# Patient Record
Sex: FEMALE | Race: WHITE | Employment: OTHER | URBAN - METROPOLITAN AREA
[De-identification: names, ages, dates, MRNs, and addresses within clinical notes are randomized per-mention and may not be internally consistent; named-entity substitution may affect disease eponyms.]

---

## 2017-01-17 ENCOUNTER — TELEPHONE (OUTPATIENT)
Dept: INTERNAL MEDICINE CLINIC | Facility: CLINIC | Age: 67
End: 2017-01-17

## 2017-01-17 ENCOUNTER — OFFICE VISIT (OUTPATIENT)
Dept: INTERNAL MEDICINE CLINIC | Facility: CLINIC | Age: 67
End: 2017-01-17

## 2017-01-17 VITALS
RESPIRATION RATE: 20 BRPM | SYSTOLIC BLOOD PRESSURE: 124 MMHG | HEART RATE: 83 BPM | DIASTOLIC BLOOD PRESSURE: 63 MMHG | WEIGHT: 210 LBS | BODY MASS INDEX: 33 KG/M2

## 2017-01-17 DIAGNOSIS — Z01.419 ENCOUNTER FOR ROUTINE GYNECOLOGIC EXAMINATION IN MEDICARE PATIENT: Primary | ICD-10-CM

## 2017-01-17 DIAGNOSIS — Z12.4 PAP SMEAR FOR CERVICAL CANCER SCREENING: ICD-10-CM

## 2017-01-17 DIAGNOSIS — Z12.31 ENCOUNTER FOR SCREENING MAMMOGRAM FOR MALIGNANT NEOPLASM OF BREAST: ICD-10-CM

## 2017-01-17 DIAGNOSIS — L71.9 ROSACEA: ICD-10-CM

## 2017-01-17 PROCEDURE — G0463 HOSPITAL OUTPT CLINIC VISIT: HCPCS | Performed by: INTERNAL MEDICINE

## 2017-01-17 PROCEDURE — 99214 OFFICE O/P EST MOD 30 MIN: CPT | Performed by: INTERNAL MEDICINE

## 2017-01-17 RX ORDER — METRONIDAZOLE 7.5 MG/G
GEL VAGINAL
Refills: 0 | Status: CANCELLED | OUTPATIENT
Start: 2017-01-17

## 2017-01-17 RX ORDER — METRONIDAZOLE 7.5 MG/G
GEL VAGINAL
COMMUNITY
End: 2018-08-27

## 2017-01-17 RX ORDER — METRONIDAZOLE 10 MG/G
GEL TOPICAL
Qty: 60 G | Refills: 1 | Status: SHIPPED | OUTPATIENT
Start: 2017-01-17 | End: 2019-05-14

## 2017-01-19 ENCOUNTER — HOSPITAL ENCOUNTER (OUTPATIENT)
Dept: MAMMOGRAPHY | Age: 67
Discharge: HOME | End: 2017-01-19
Attending: INTERNAL MEDICINE
Payer: MEDICARE

## 2017-01-19 DIAGNOSIS — Z12.31 ENCOUNTER FOR SCREENING MAMMOGRAM FOR MALIGNANT NEOPLASM OF BREAST: ICD-10-CM

## 2017-01-19 PROCEDURE — 77067 SCR MAMMO BI INCL CAD: CPT

## 2017-01-20 LAB — LAST PAP RESULT: NORMAL

## 2017-01-23 LAB — HPV I/H RISK 1 DNA SPEC QL NAA+PROBE: NEGATIVE

## 2017-01-27 ENCOUNTER — OFFICE VISIT (OUTPATIENT)
Dept: INTERNAL MEDICINE CLINIC | Facility: CLINIC | Age: 67
End: 2017-01-27

## 2017-01-27 VITALS
BODY MASS INDEX: 33.19 KG/M2 | SYSTOLIC BLOOD PRESSURE: 111 MMHG | RESPIRATION RATE: 18 BRPM | TEMPERATURE: 98 F | HEIGHT: 66.5 IN | DIASTOLIC BLOOD PRESSURE: 64 MMHG | HEART RATE: 72 BPM | WEIGHT: 209 LBS

## 2017-01-27 DIAGNOSIS — Z00.00 PHYSICAL EXAM: Primary | ICD-10-CM

## 2017-01-27 DIAGNOSIS — E78.5 HYPERLIPIDEMIA LDL GOAL <130: ICD-10-CM

## 2017-01-27 DIAGNOSIS — R53.81 MALAISE: ICD-10-CM

## 2017-01-27 PROCEDURE — G0438 PPPS, INITIAL VISIT: HCPCS | Performed by: INTERNAL MEDICINE

## 2017-01-27 RX ORDER — ALBUTEROL SULFATE 90 UG/1
2 AEROSOL, METERED RESPIRATORY (INHALATION) EVERY 4 HOURS PRN
Qty: 1 INHALER | Refills: 3 | Status: SHIPPED | OUTPATIENT
Start: 2017-01-27 | End: 2018-01-26

## 2017-01-28 PROBLEM — Z12.4 PAP SMEAR FOR CERVICAL CANCER SCREENING: Status: RESOLVED | Noted: 2017-01-17 | Resolved: 2017-01-28

## 2017-01-28 PROBLEM — Z12.31 ENCOUNTER FOR SCREENING MAMMOGRAM FOR MALIGNANT NEOPLASM OF BREAST: Status: RESOLVED | Noted: 2017-01-17 | Resolved: 2017-01-28

## 2017-01-28 PROBLEM — Z01.419 ENCOUNTER FOR ROUTINE GYNECOLOGIC EXAMINATION IN MEDICARE PATIENT: Status: RESOLVED | Noted: 2017-01-17 | Resolved: 2017-01-28

## 2017-01-29 NOTE — PROGRESS NOTES
HPI:   Kirsten Menon is a 77year old female who presents for a Medicare Initial Annual Wellness visit (Once after 12 month Medicare anniversery) .     Patient reports that she is doing very well, she is exercising every day, has lost weight, states that s (VITAMIN B12) 100 MCG Oral Tab Take  by mouth. MEDICAL INFORMATION:   She  has a past medical history of Depression; Right leg DVT ([address] (1990); Ruptured ovarian cyst (1975); Knee problem (2008); and BCC (basal cell carcinoma) (2014, 2002).     She  ha Hearing Assessment  (Required for AWV/SWV)    Hearing Screening    Screening Method:  Questionnaire      I have a problem hearing over the telephone:  No I have trouble following   the conversations when two or more people are talking at the same time: symmetric, no tenderness/mass/nodules; no carotid bruit or JVD   Back:   Symmetric, no curvature, ROM normal, no CVA tenderness   Lungs:   Clear to auscultation bilaterally, respirations unlabored   Heart:  Regular rate and rhythm, S1 and S2 normal, no mur check lipids continue low-fat low-cholesterol diet  -     Albuterol Sulfate HFA (PROAIR HFA) 108 (90 Base) MCG/ACT Inhalation Aero Soln; Inhale 2 puffs into the lungs every 4 (four) hours as needed for Wheezing.  -     CBC W Differential W Platelet [E];  Fu Showering: Able without help    Toileting: Able without help    Dressing: Able without help    Eating: Able without help    Driving: Able without help    Preparing your meals: Able without help    Managing money/bills: Able without help    Taking medicatio Internal Lab or Procedure External Lab or Procedure   Diabetes Screening      HbgA1C   Annually GLYCOHEMOGLOBIN (HGA1C) (L) (%)   Date Value   11/23/2011 5.4    No flowsheet data found.     Fasting Blood Sugar (FSB)Annually   GLUCOSE (P) (mg/dL)   Date Valu for this or any previous visit.          SPECIFIC DISEASE MONITORING Internal Lab or Procedure External Lab or Procedure   Annual Monitoring of Persistent     Medications (ACE/ARB, digoxin diuretics, anticonvulsants.)    Potassium  Annually POTASSIUM (P) (m

## 2017-01-29 NOTE — PROGRESS NOTES
HPI:    Patient ID: Kameron Shah is a 77year old female     HPI    Review of Systems         Current Outpatient Prescriptions:  Albuterol Sulfate HFA (PROAIR HFA) 108 (90 Base) MCG/ACT Inhalation Aero Soln Inhale 2 puffs into the lungs every 4 (four) ho

## 2017-02-03 ENCOUNTER — OFFICE VISIT (OUTPATIENT)
Dept: FAMILY MEDICINE CLINIC | Facility: CLINIC | Age: 67
End: 2017-02-03

## 2017-02-03 ENCOUNTER — TELEPHONE (OUTPATIENT)
Dept: FAMILY MEDICINE CLINIC | Facility: CLINIC | Age: 67
End: 2017-02-03

## 2017-02-03 ENCOUNTER — HOSPITAL ENCOUNTER (OUTPATIENT)
Dept: GENERAL RADIOLOGY | Age: 67
Discharge: HOME | End: 2017-02-03
Attending: FAMILY MEDICINE | Admitting: FAMILY MEDICINE
Payer: MEDICARE

## 2017-02-03 VITALS
WEIGHT: 205 LBS | DIASTOLIC BLOOD PRESSURE: 72 MMHG | TEMPERATURE: 98 F | SYSTOLIC BLOOD PRESSURE: 118 MMHG | HEART RATE: 75 BPM | RESPIRATION RATE: 12 BRPM | OXYGEN SATURATION: 95 % | BODY MASS INDEX: 33 KG/M2

## 2017-02-03 DIAGNOSIS — R05.9 COUGH: Primary | ICD-10-CM

## 2017-02-03 DIAGNOSIS — R05.9 COUGH: ICD-10-CM

## 2017-02-03 PROCEDURE — 71020 XR CHEST PA + LAT CHEST (CPT=71020): CPT

## 2017-02-03 PROCEDURE — G0463 HOSPITAL OUTPT CLINIC VISIT: HCPCS | Performed by: FAMILY MEDICINE

## 2017-02-03 PROCEDURE — 99214 OFFICE O/P EST MOD 30 MIN: CPT | Performed by: FAMILY MEDICINE

## 2017-02-03 RX ORDER — AMOXICILLIN AND CLAVULANATE POTASSIUM 875; 125 MG/1; MG/1
1 TABLET, FILM COATED ORAL 2 TIMES DAILY
Qty: 28 TABLET | Refills: 0 | Status: SHIPPED | OUTPATIENT
Start: 2017-02-03 | End: 2017-02-08

## 2017-02-03 RX ORDER — FLUTICASONE PROPIONATE 50 MCG
SPRAY, SUSPENSION (ML) NASAL DAILY
COMMUNITY
End: 2017-02-04

## 2017-02-03 NOTE — PROGRESS NOTES
Blood pressure 118/72, pulse 75, temperature 98 °F (36.7 °C), temperature source Oral, resp. rate 12, weight 205 lb (92.987 kg), SpO2 95 %.   Patient presents today complaining of a four-day history of a sore throat and some facial pressure history of asthm

## 2017-02-03 NOTE — TELEPHONE ENCOUNTER
----- Message from Estrella Etienne DO sent at 2/3/2017  1:26 PM CST -----  CXR NORMAL. RX SENT TO PHARMACY FOR SINUSITIS.

## 2017-02-04 RX ORDER — FLUTICASONE PROPIONATE 50 MCG
2 SPRAY, SUSPENSION (ML) NASAL DAILY
Qty: 1 BOTTLE | Refills: 1 | Status: SHIPPED | OUTPATIENT
Start: 2017-02-04 | End: 2018-01-26

## 2017-02-04 NOTE — TELEPHONE ENCOUNTER
Pt informed regarding Augmentin sent in by  [facility] [place] yesterday since chest result was normal. Pt asking what she can take to help her chest congestion (not taking anything OTC); states also has PND. Should she also use Flonase?  If so, would like rx for flonas

## 2017-02-06 RX ORDER — MOMETASONE FUROATE AND FORMOTEROL FUMARATE DIHYDRATE 200; 5 UG/1; UG/1
AEROSOL RESPIRATORY (INHALATION)
Qty: 39 G | Refills: 1 | Status: SHIPPED | OUTPATIENT
Start: 2017-02-06 | End: 2018-01-08

## 2017-02-07 ENCOUNTER — TELEPHONE (OUTPATIENT)
Dept: INTERNAL MEDICINE CLINIC | Facility: CLINIC | Age: 67
End: 2017-02-07

## 2017-02-07 RX ORDER — ALBUTEROL SULFATE 90 UG/1
2 AEROSOL, METERED RESPIRATORY (INHALATION) EVERY 4 HOURS PRN
Qty: 1 INHALER | Refills: 6 | Status: SHIPPED | OUTPATIENT
Start: 2017-02-07 | End: 2018-08-27

## 2017-02-07 NOTE — TELEPHONE ENCOUNTER
[facility] pharmacy would like to know if they could change pt's proair rx to ventolin?  Pt is currently at the pharmacy

## 2017-02-07 NOTE — TELEPHONE ENCOUNTER
Pt calling states she would like to speak to Dr Sina Mcfarlane about returning issue with sinus infection. Pt states despite meds she is not improving. Please advise.

## 2017-02-08 ENCOUNTER — OFFICE VISIT (OUTPATIENT)
Dept: INTERNAL MEDICINE CLINIC | Facility: CLINIC | Age: 67
End: 2017-02-08

## 2017-02-08 VITALS
BODY MASS INDEX: 33 KG/M2 | RESPIRATION RATE: 20 BRPM | WEIGHT: 206 LBS | HEART RATE: 79 BPM | OXYGEN SATURATION: 96 % | TEMPERATURE: 98 F | DIASTOLIC BLOOD PRESSURE: 66 MMHG | SYSTOLIC BLOOD PRESSURE: 112 MMHG

## 2017-02-08 DIAGNOSIS — J44.9 BRONCHITIS WITH CHRONIC AIRWAY OBSTRUCTION (HCC): Primary | ICD-10-CM

## 2017-02-08 PROBLEM — J44.89 BRONCHITIS WITH CHRONIC AIRWAY OBSTRUCTION: Status: ACTIVE | Noted: 2017-02-08

## 2017-02-08 PROCEDURE — 99214 OFFICE O/P EST MOD 30 MIN: CPT | Performed by: INTERNAL MEDICINE

## 2017-02-08 PROCEDURE — G0463 HOSPITAL OUTPT CLINIC VISIT: HCPCS | Performed by: INTERNAL MEDICINE

## 2017-02-08 RX ORDER — PREDNISONE 10 MG/1
TABLET ORAL
Qty: 30 TABLET | Refills: 1 | Status: SHIPPED | OUTPATIENT
Start: 2017-02-08 | End: 2017-02-19

## 2017-02-08 RX ORDER — CLARITHROMYCIN 500 MG/1
500 TABLET, COATED ORAL 2 TIMES DAILY
Qty: 20 TABLET | Refills: 0 | Status: SHIPPED | OUTPATIENT
Start: 2017-02-08 | End: 2018-01-08

## 2017-02-08 RX ORDER — OXYBUTYNIN CHLORIDE 5 MG/1
TABLET ORAL
COMMUNITY
Start: 2017-02-04 | End: 2018-01-26

## 2017-02-08 RX ORDER — PREDNISONE 10 MG/1
TABLET ORAL
Qty: 30 TABLET | Refills: 1 | Status: SHIPPED
Start: 2017-02-08 | End: 2017-02-19

## 2017-02-08 RX ORDER — PREDNISONE 10 MG/1
TABLET ORAL
Qty: 30 TABLET | Refills: 0 | Status: SHIPPED
Start: 2017-02-08 | End: 2017-02-08 | Stop reason: CLARIF

## 2017-02-08 NOTE — PROGRESS NOTES
HPI:    Patient ID: France Villarreal is a 77year old female presents for follow-up of the cough.     HPI  Patient was seen in the office 5 days ago by family physician, diagnosed with bronchitis, started to Augmentin, Flonase nasal spray, and patient has bee Inhalation Aero Soln Inhale 2 puffs into the lungs every 4 (four) hours as needed for Wheezing. Disp: 1 Inhaler Rfl: 6   Fluticasone Propionate 50 MCG/ACT Nasal Suspension 2 sprays by Each Nare route daily.  Disp: 1 Bottle Rfl: 1   Mometasone Furo-Formotero present. Pulmonary/Chest: Effort normal. She has wheezes in the right middle field, the right lower field, the left upper field, the left middle field and the left lower field. She has no rhonchi. She has no rales.    Diffuse expiratory wheezing, frequentl

## 2017-02-17 ENCOUNTER — TELEPHONE (OUTPATIENT)
Dept: INTERNAL MEDICINE CLINIC | Facility: CLINIC | Age: 67
End: 2017-02-17

## 2017-02-17 NOTE — TELEPHONE ENCOUNTER
Patient calling to inform Jannet Meyer that she is feeling much better and wants to thank Jannet Meyer for taking such good care of her.

## 2017-12-21 RX ORDER — MOMETASONE FUROATE AND FORMOTEROL FUMARATE DIHYDRATE 200; 5 UG/1; UG/1
AEROSOL RESPIRATORY (INHALATION)
Qty: 13 G | Refills: 0 | Status: SHIPPED | OUTPATIENT
Start: 2017-12-21 | End: 2019-05-14

## 2017-12-31 ENCOUNTER — HOSPITAL ENCOUNTER (OUTPATIENT)
Age: 67
Discharge: HOME | End: 2017-12-31
Attending: EMERGENCY MEDICINE
Payer: MEDICARE

## 2017-12-31 VITALS
HEART RATE: 110 BPM | SYSTOLIC BLOOD PRESSURE: 134 MMHG | RESPIRATION RATE: 20 BRPM | HEIGHT: 67 IN | OXYGEN SATURATION: 99 % | TEMPERATURE: 98 F | BODY MASS INDEX: 31.39 KG/M2 | WEIGHT: 200 LBS | DIASTOLIC BLOOD PRESSURE: 84 MMHG

## 2017-12-31 DIAGNOSIS — J20.9 ACUTE BRONCHITIS, UNSPECIFIED ORGANISM: ICD-10-CM

## 2017-12-31 DIAGNOSIS — H66.92 LEFT OTITIS MEDIA, UNSPECIFIED OTITIS MEDIA TYPE: Primary | ICD-10-CM

## 2017-12-31 PROCEDURE — 99213 OFFICE O/P EST LOW 20 MIN: CPT

## 2017-12-31 PROCEDURE — 99204 OFFICE O/P NEW MOD 45 MIN: CPT

## 2017-12-31 RX ORDER — AMOXICILLIN 875 MG/1
875 TABLET, COATED ORAL 2 TIMES DAILY
Qty: 20 TABLET | Refills: 0 | Status: SHIPPED | OUTPATIENT
Start: 2017-12-31 | End: 2018-01-10

## 2017-12-31 RX ORDER — BENZONATATE 200 MG/1
200 CAPSULE ORAL 3 TIMES DAILY PRN
Qty: 15 CAPSULE | Refills: 0 | Status: SHIPPED | OUTPATIENT
Start: 2017-12-31 | End: 2018-01-05

## 2017-12-31 NOTE — ED PROVIDER NOTES
Patient Seen in: [facility]    History   Patient presents with:  Cough/URI    Stated Complaint: sinus problem    HPI  Patient complains of cough and congestion for 2-1/2 weeks.   There is been sinus pain first on the right person, place, and time. She appears well-developed and well-nourished. No distress. Well appearing   HENT:   Head: Normocephalic and atraumatic.    Right Ear: Tympanic membrane and external ear normal.   Left Ear: External ear normal. Tympanic membrane i

## 2018-01-08 ENCOUNTER — OFFICE VISIT (OUTPATIENT)
Dept: INTERNAL MEDICINE CLINIC | Facility: CLINIC | Age: 68
End: 2018-01-08

## 2018-01-08 VITALS
TEMPERATURE: 98 F | WEIGHT: 214 LBS | SYSTOLIC BLOOD PRESSURE: 128 MMHG | BODY MASS INDEX: 34 KG/M2 | RESPIRATION RATE: 22 BRPM | OXYGEN SATURATION: 98 % | DIASTOLIC BLOOD PRESSURE: 65 MMHG | HEART RATE: 83 BPM

## 2018-01-08 DIAGNOSIS — E78.5 HYPERLIPIDEMIA LDL GOAL <130: ICD-10-CM

## 2018-01-08 DIAGNOSIS — J20.9 BRONCHITIS, ACUTE, WITH BRONCHOSPASM: Primary | ICD-10-CM

## 2018-01-08 PROCEDURE — 99212 OFFICE O/P EST SF 10 MIN: CPT | Performed by: INTERNAL MEDICINE

## 2018-01-08 PROCEDURE — 99214 OFFICE O/P EST MOD 30 MIN: CPT | Performed by: INTERNAL MEDICINE

## 2018-01-08 RX ORDER — CODEINE PHOSPHATE AND GUAIFENESIN 10; 100 MG/5ML; MG/5ML
10 SOLUTION ORAL 4 TIMES DAILY PRN
Qty: 240 ML | Refills: 0 | Status: SHIPPED | OUTPATIENT
Start: 2018-01-08 | End: 2018-08-27 | Stop reason: ALTCHOICE

## 2018-01-08 RX ORDER — CLARITHROMYCIN 500 MG/1
500 TABLET, COATED ORAL 2 TIMES DAILY
Qty: 20 TABLET | Refills: 0 | Status: SHIPPED | OUTPATIENT
Start: 2018-01-08 | End: 2018-01-16

## 2018-01-09 NOTE — PROGRESS NOTES
HPI:    Patient ID: Romaine Pastrana is a 79year old female. Presents for evaluation of the cough.     HPI  Patient reports that for about a month she has been bothered by cough, initially she had cold symptoms that lasted few days and resolved, and shortly Oral Solution  Disp:  Rfl:    Fexofenadine HCl (ALLEGRA OR) Take 1 tablet daily Disp:  Rfl:    metRONIDAZOLE 0.75 % Vaginal Gel Used for rocea ,affected area Disp:  Rfl:    MetroNIDAZOLE (METROGEL) 1 % External Gel Apply thin layer  daily Disp: 60 g Rfl: 1 oropharyngeal erythema present. Which is present   Eyes: Conjunctivae are normal. Pupils are equal, round, and reactive to light. Cardiovascular: Normal rate, S2 normal and normal heart sounds. Pulmonary/Chest: No accessory muscle usage.  No respirat

## 2018-01-09 NOTE — PROGRESS NOTES
HPI:    Patient ID: Connor Sol is a 79year old female. Presents for evaluation of the cough.     HPI  Patient reports that for about a month she has been bothered by cough, initially she had cold symptoms that lasted few days and resolved, and shortly MG/5ML Oral Solution  Disp:  Rfl:    Fexofenadine HCl (ALLEGRA OR) Take 1 tablet daily Disp:  Rfl:    metRONIDAZOLE 0.75 % Vaginal Gel Used for rocea ,affected area Disp:  Rfl:    MetroNIDAZOLE (METROGEL) 1 % External Gel Apply thin layer  daily Disp: 60 g mL by mouth 4 (four) times daily as needed for cough.            Imaging & Referrals:  None         #[id number]

## 2018-01-16 ENCOUNTER — TELEPHONE (OUTPATIENT)
Dept: INTERNAL MEDICINE CLINIC | Facility: CLINIC | Age: 68
End: 2018-01-16

## 2018-01-16 RX ORDER — CLARITHROMYCIN 500 MG/1
500 TABLET, COATED ORAL 2 TIMES DAILY
Qty: 10 TABLET | Refills: 0 | Status: SHIPPED | OUTPATIENT
Start: 2018-01-16 | End: 2018-01-26

## 2018-01-16 NOTE — TELEPHONE ENCOUNTER
Patient prescribed 10 day regimen of clarithromycin on 1/8. Pt states she has been taking it and her symptoms have improved but she feels like she would like a few extra days of the abx to knock out her illness.     Pt requesting 4 more days of the abx to b

## 2018-01-25 ENCOUNTER — TELEPHONE (OUTPATIENT)
Dept: OTHER | Age: 68
End: 2018-01-25

## 2018-01-25 NOTE — TELEPHONE ENCOUNTER
Patient can see any doctor at the clinic if needed, I can see her tomorrow at 9;40 am double book in the morning to [place] if she wants to

## 2018-01-25 NOTE — TELEPHONE ENCOUNTER
Pt called, message per Dr given.   Verbalized understanding and compliance    appt scheduled 9:40 1/26/18

## 2018-01-25 NOTE — TELEPHONE ENCOUNTER
Patient reported that since Immediate Care visit 12/31/17, office visit with Dr. Dewey Muhammad 1/8/18, she has finished antibiotics on 1/23/18 and was feeling better, but not today, feeling worse, didn't sleep well last night and has history of asthma.  Patient st

## 2018-01-26 ENCOUNTER — OFFICE VISIT (OUTPATIENT)
Dept: INTERNAL MEDICINE CLINIC | Facility: CLINIC | Age: 68
End: 2018-01-26

## 2018-01-26 VITALS
TEMPERATURE: 98 F | SYSTOLIC BLOOD PRESSURE: 130 MMHG | BODY MASS INDEX: 33 KG/M2 | DIASTOLIC BLOOD PRESSURE: 66 MMHG | WEIGHT: 210 LBS | RESPIRATION RATE: 18 BRPM | HEART RATE: 73 BPM

## 2018-01-26 DIAGNOSIS — J01.90 ACUTE SINUSITIS, RECURRENCE NOT SPECIFIED, UNSPECIFIED LOCATION: Primary | ICD-10-CM

## 2018-01-26 PROCEDURE — 99212 OFFICE O/P EST SF 10 MIN: CPT | Performed by: INTERNAL MEDICINE

## 2018-01-26 PROCEDURE — 99213 OFFICE O/P EST LOW 20 MIN: CPT | Performed by: INTERNAL MEDICINE

## 2018-01-26 RX ORDER — CEFUROXIME AXETIL 500 MG/1
500 TABLET ORAL 2 TIMES DAILY
Qty: 14 TABLET | Refills: 0 | Status: SHIPPED | OUTPATIENT
Start: 2018-01-26 | End: 2018-01-26

## 2018-01-26 RX ORDER — CEFUROXIME AXETIL 500 MG/1
500 TABLET ORAL 2 TIMES DAILY
Qty: 14 TABLET | Refills: 0 | Status: SHIPPED | OUTPATIENT
Start: 2018-01-26 | End: 2018-08-27 | Stop reason: ALTCHOICE

## 2018-01-26 NOTE — PROGRESS NOTES
HPI:    Patient ID: Jericho Yoder is a 79year old female presents for evaluation of the cough    HPI  Patient reports that last couple days she has been having more nasal congestion, feels postnasal drainage that irritates her throat and causes cough, co ,affected area Disp:  Rfl:    MetroNIDAZOLE (METROGEL) 1 % External Gel Apply thin layer  daily Disp: 60 g Rfl: 1   Omega-3 Fatty Acids (OMEGA-3 FISH OIL) 1000 MG Oral Cap Take  by mouth.  Disp:  Rfl:    Multiple Vitamin (MULTIVITAMINS) Oral Cap Take  by mo washes to the nose, if no improvement in 7-10 days will consider antibiotic, patient to report if she is not better in 7-10 days  No orders of the defined types were placed in this encounter.       Meds This Visit:  Signed Prescriptions Disp Refills    Cefu

## 2018-02-20 ENCOUNTER — LAB ENCOUNTER (OUTPATIENT)
Dept: LAB | Age: 68
End: 2018-02-20
Attending: INTERNAL MEDICINE
Payer: MEDICARE

## 2018-02-20 DIAGNOSIS — E78.5 HYPERLIPIDEMIA LDL GOAL <130: ICD-10-CM

## 2018-02-20 DIAGNOSIS — J20.9 BRONCHITIS, ACUTE, WITH BRONCHOSPASM: ICD-10-CM

## 2018-02-20 LAB
ALBUMIN SERPL BCP-MCNC: 3.8 G/DL (ref 3.5–4.8)
ALBUMIN/GLOB SERPL: 1.4 {RATIO} (ref 1–2)
ALP SERPL-CCNC: 69 U/L (ref 32–100)
ALT SERPL-CCNC: 15 U/L (ref 14–54)
ANION GAP SERPL CALC-SCNC: 7 MMOL/L (ref 0–18)
AST SERPL-CCNC: 20 U/L (ref 15–41)
BASOPHILS # BLD: 0 K/UL (ref 0–0.2)
BASOPHILS NFR BLD: 1 %
BILIRUB SERPL-MCNC: 0.5 MG/DL (ref 0.3–1.2)
BUN SERPL-MCNC: 11 MG/DL (ref 8–20)
BUN/CREAT SERPL: 14.3 (ref 10–20)
CALCIUM SERPL-MCNC: 9.7 MG/DL (ref 8.5–10.5)
CHLORIDE SERPL-SCNC: 105 MMOL/L (ref 95–110)
CHOLEST SERPL-MCNC: 209 MG/DL (ref 110–200)
CO2 SERPL-SCNC: 24 MMOL/L (ref 22–32)
CREAT SERPL-MCNC: 0.77 MG/DL (ref 0.5–1.5)
EOSINOPHIL # BLD: 0.1 K/UL (ref 0–0.7)
EOSINOPHIL NFR BLD: 3 %
ERYTHROCYTE [DISTWIDTH] IN BLOOD BY AUTOMATED COUNT: 13.5 % (ref 11–15)
GLOBULIN PLAS-MCNC: 2.8 G/DL (ref 2.5–3.7)
GLUCOSE SERPL-MCNC: 84 MG/DL (ref 70–99)
HCT VFR BLD AUTO: 45.3 % (ref 35–48)
HDLC SERPL-MCNC: 43 MG/DL
HGB BLD-MCNC: 15.1 G/DL (ref 12–16)
LDLC SERPL CALC-MCNC: 118 MG/DL (ref 0–99)
LYMPHOCYTES # BLD: 1.3 K/UL (ref 1–4)
LYMPHOCYTES NFR BLD: 34 %
MCH RBC QN AUTO: 33 PG (ref 27–32)
MCHC RBC AUTO-ENTMCNC: 33.4 G/DL (ref 32–37)
MCV RBC AUTO: 98.7 FL (ref 80–100)
MONOCYTES # BLD: 0.3 K/UL (ref 0–1)
MONOCYTES NFR BLD: 9 %
NEUTROPHILS # BLD AUTO: 2 K/UL (ref 1.8–7.7)
NEUTROPHILS NFR BLD: 53 %
NONHDLC SERPL-MCNC: 166 MG/DL
OSMOLALITY UR CALC.SUM OF ELEC: 281 MOSM/KG (ref 275–295)
PATIENT FASTING: YES
PLATELET # BLD AUTO: 145 K/UL (ref 140–400)
PMV BLD AUTO: 9.2 FL (ref 7.4–10.3)
POTASSIUM SERPL-SCNC: 4.2 MMOL/L (ref 3.3–5.1)
PROT SERPL-MCNC: 6.6 G/DL (ref 5.9–8.4)
RBC # BLD AUTO: 4.59 M/UL (ref 3.7–5.4)
SODIUM SERPL-SCNC: 136 MMOL/L (ref 136–144)
TRIGL SERPL-MCNC: 242 MG/DL (ref 1–149)
TSH SERPL-ACNC: 1.84 UIU/ML (ref 0.45–5.33)
WBC # BLD AUTO: 3.8 K/UL (ref 4–11)

## 2018-02-20 PROCEDURE — 80053 COMPREHEN METABOLIC PANEL: CPT

## 2018-02-20 PROCEDURE — 85025 COMPLETE CBC W/AUTO DIFF WBC: CPT

## 2018-02-20 PROCEDURE — 80061 LIPID PANEL: CPT

## 2018-02-20 PROCEDURE — 36415 COLL VENOUS BLD VENIPUNCTURE: CPT

## 2018-02-20 PROCEDURE — 84443 ASSAY THYROID STIM HORMONE: CPT

## 2018-02-22 ENCOUNTER — TELEPHONE (OUTPATIENT)
Dept: INTERNAL MEDICINE CLINIC | Facility: CLINIC | Age: 68
End: 2018-02-22

## 2018-02-22 DIAGNOSIS — R89.9 ABNORMAL LABORATORY TEST: Primary | ICD-10-CM

## 2018-08-27 ENCOUNTER — OFFICE VISIT (OUTPATIENT)
Dept: INTERNAL MEDICINE CLINIC | Facility: CLINIC | Age: 68
End: 2018-08-27
Payer: MEDICARE

## 2018-08-27 VITALS
WEIGHT: 173 LBS | DIASTOLIC BLOOD PRESSURE: 58 MMHG | HEART RATE: 73 BPM | RESPIRATION RATE: 17 BRPM | TEMPERATURE: 97 F | HEIGHT: 67 IN | SYSTOLIC BLOOD PRESSURE: 119 MMHG | BODY MASS INDEX: 27.15 KG/M2

## 2018-08-27 DIAGNOSIS — N89.8 VAGINAL ITCHING: Primary | ICD-10-CM

## 2018-08-27 PROBLEM — R53.81 MALAISE: Status: RESOLVED | Noted: 2017-01-27 | Resolved: 2018-08-27

## 2018-08-27 PROBLEM — J44.89 BRONCHITIS WITH CHRONIC AIRWAY OBSTRUCTION: Status: RESOLVED | Noted: 2017-02-08 | Resolved: 2018-08-27

## 2018-08-27 PROBLEM — Z00.00 PHYSICAL EXAM: Status: RESOLVED | Noted: 2017-01-27 | Resolved: 2018-08-27

## 2018-08-27 PROBLEM — J44.9 BRONCHITIS WITH CHRONIC AIRWAY OBSTRUCTION (HCC): Status: RESOLVED | Noted: 2017-02-08 | Resolved: 2018-08-27

## 2018-08-27 PROCEDURE — 99213 OFFICE O/P EST LOW 20 MIN: CPT | Performed by: INTERNAL MEDICINE

## 2018-08-27 PROCEDURE — G0463 HOSPITAL OUTPT CLINIC VISIT: HCPCS | Performed by: INTERNAL MEDICINE

## 2018-08-27 RX ORDER — ALBUTEROL SULFATE 90 UG/1
2 AEROSOL, METERED RESPIRATORY (INHALATION) EVERY 4 HOURS PRN
Qty: 1 INHALER | Refills: 6 | Status: SHIPPED | OUTPATIENT
Start: 2018-08-27 | End: 2019-05-14

## 2018-08-27 RX ORDER — CLOTRIMAZOLE AND BETAMETHASONE DIPROPIONATE 10; .64 MG/G; MG/G
CREAM TOPICAL
Qty: 45 G | Refills: 0 | Status: SHIPPED | OUTPATIENT
Start: 2018-08-27 | End: 2019-11-15

## 2018-08-28 NOTE — PROGRESS NOTES
HPI:    Patient ID: Lata New is a 79year old female.   Presents for evaluation of vaginal itching    HPI  Patient reports that last several days she is bothered by itching which feels like it is vaginal, no significant discharge may be small amount w Omega-3 Fatty Acids (OMEGA-3 FISH OIL) 1000 MG Oral Cap Take  by mouth. Disp:  Rfl:    Multiple Vitamin (MULTIVITAMINS) Oral Cap Take  by mouth. Disp:  Rfl:    folic acid (FOLVITE) 343 MCG Oral Tab Take  by mouth.  Disp:  Rfl:    Glucosamine-Chondroitin (OP Sig: Inhale 2 puffs into the lungs every 4 (four) hours as needed for Wheezing.            Imaging & Referrals:  None       #[id number]

## 2018-08-29 LAB
CANDIDA SCREEN: NEGATIVE
TRICHOMONAS SCREEN: NEGATIVE

## 2018-08-30 RX ORDER — METRONIDAZOLE 7.5 MG/G
GEL VAGINAL
Qty: 1 TUBE | Refills: 0 | Status: SHIPPED | OUTPATIENT
Start: 2018-08-30 | End: 2019-11-15

## 2019-03-27 ENCOUNTER — HOSPITAL ENCOUNTER (OUTPATIENT)
Dept: MAMMOGRAPHY | Age: 69
Discharge: HOME | End: 2019-03-27
Attending: OBSTETRICS & GYNECOLOGY
Payer: MEDICARE

## 2019-03-27 ENCOUNTER — LAB ENCOUNTER (OUTPATIENT)
Dept: LAB | Age: 69
End: 2019-03-27
Attending: OBSTETRICS & GYNECOLOGY
Payer: MEDICARE

## 2019-03-27 DIAGNOSIS — Z12.31 VISIT FOR SCREENING MAMMOGRAM: ICD-10-CM

## 2019-03-27 DIAGNOSIS — Z00.00 LABORATORY EXAMINATION ORDERED AS PART OF A ROUTINE GENERAL MEDICAL EXAMINATION: ICD-10-CM

## 2019-03-27 LAB
ALBUMIN SERPL-MCNC: 3.7 G/DL (ref 3.4–5)
ALBUMIN/GLOB SERPL: 1.1 {RATIO} (ref 1–2)
ALP LIVER SERPL-CCNC: 79 U/L (ref 55–142)
ALT SERPL-CCNC: 14 U/L (ref 13–56)
ANION GAP SERPL CALC-SCNC: 6 MMOL/L (ref 0–18)
AST SERPL-CCNC: 12 U/L (ref 15–37)
BASOPHILS # BLD AUTO: 0.03 X10(3) UL (ref 0–0.2)
BASOPHILS NFR BLD AUTO: 0.9 %
BILIRUB SERPL-MCNC: 0.4 MG/DL (ref 0.1–2)
BUN BLD-MCNC: 19 MG/DL (ref 7–18)
BUN/CREAT SERPL: 21.1 (ref 10–20)
CALCIUM BLD-MCNC: 10 MG/DL (ref 8.5–10.1)
CHLORIDE SERPL-SCNC: 110 MMOL/L (ref 98–107)
CHOLEST SMN-MCNC: 175 MG/DL (ref ?–200)
CO2 SERPL-SCNC: 27 MMOL/L (ref 21–32)
CREAT BLD-MCNC: 0.9 MG/DL (ref 0.55–1.02)
DEPRECATED RDW RBC AUTO: 47.4 FL (ref 35.1–46.3)
EOSINOPHIL # BLD AUTO: 0.09 X10(3) UL (ref 0–0.7)
EOSINOPHIL NFR BLD AUTO: 2.6 %
ERYTHROCYTE [DISTWIDTH] IN BLOOD BY AUTOMATED COUNT: 12.7 % (ref 11–15)
GLOBULIN PLAS-MCNC: 3.5 G/DL (ref 2.8–4.4)
GLUCOSE BLD-MCNC: 93 MG/DL (ref 70–99)
HCT VFR BLD AUTO: 41 % (ref 35–48)
HDLC SERPL-MCNC: 63 MG/DL (ref 40–59)
HGB BLD-MCNC: 13.2 G/DL (ref 12–16)
IMM GRANULOCYTES # BLD AUTO: 0.01 X10(3) UL (ref 0–1)
IMM GRANULOCYTES NFR BLD: 0.3 %
LDLC SERPL CALC-MCNC: 99 MG/DL (ref ?–100)
LYMPHOCYTES # BLD AUTO: 1.15 X10(3) UL (ref 1–4)
LYMPHOCYTES NFR BLD AUTO: 33.6 %
M PROTEIN MFR SERPL ELPH: 7.2 G/DL (ref 6.4–8.2)
MCH RBC QN AUTO: 32.5 PG (ref 26–34)
MCHC RBC AUTO-ENTMCNC: 32.2 G/DL (ref 31–37)
MCV RBC AUTO: 101 FL (ref 80–100)
MONOCYTES # BLD AUTO: 0.31 X10(3) UL (ref 0.1–1)
MONOCYTES NFR BLD AUTO: 9.1 %
NEUTROPHILS # BLD AUTO: 1.83 X10 (3) UL (ref 1.5–7.7)
NEUTROPHILS # BLD AUTO: 1.83 X10(3) UL (ref 1.5–7.7)
NEUTROPHILS NFR BLD AUTO: 53.5 %
NONHDLC SERPL-MCNC: 112 MG/DL (ref ?–130)
OSMOLALITY SERPL CALC.SUM OF ELEC: 298 MOSM/KG (ref 275–295)
PLATELET # BLD AUTO: 152 10(3)UL (ref 150–450)
POTASSIUM SERPL-SCNC: 4.7 MMOL/L (ref 3.5–5.1)
RBC # BLD AUTO: 4.06 X10(6)UL (ref 3.8–5.3)
SODIUM SERPL-SCNC: 143 MMOL/L (ref 136–145)
TRIGL SERPL-MCNC: 66 MG/DL (ref 30–149)
TSI SER-ACNC: 1.44 MIU/ML (ref 0.36–3.74)
VLDLC SERPL CALC-MCNC: 13 MG/DL (ref 0–30)
WBC # BLD AUTO: 3.4 X10(3) UL (ref 4–11)

## 2019-03-27 PROCEDURE — 84443 ASSAY THYROID STIM HORMONE: CPT

## 2019-03-27 PROCEDURE — 36415 COLL VENOUS BLD VENIPUNCTURE: CPT

## 2019-03-27 PROCEDURE — 82306 VITAMIN D 25 HYDROXY: CPT

## 2019-03-27 PROCEDURE — 80061 LIPID PANEL: CPT

## 2019-03-27 PROCEDURE — 77063 BREAST TOMOSYNTHESIS BI: CPT | Performed by: OBSTETRICS & GYNECOLOGY

## 2019-03-27 PROCEDURE — 77067 SCR MAMMO BI INCL CAD: CPT | Performed by: OBSTETRICS & GYNECOLOGY

## 2019-03-27 PROCEDURE — 85025 COMPLETE CBC W/AUTO DIFF WBC: CPT

## 2019-03-27 PROCEDURE — 80053 COMPREHEN METABOLIC PANEL: CPT

## 2019-03-29 LAB — 25(OH)D3 SERPL-MCNC: 36.7 NG/ML (ref 30–100)

## 2019-05-14 ENCOUNTER — OFFICE VISIT (OUTPATIENT)
Dept: INTERNAL MEDICINE CLINIC | Facility: CLINIC | Age: 69
End: 2019-05-14
Payer: MEDICARE

## 2019-05-14 ENCOUNTER — TELEPHONE (OUTPATIENT)
Dept: INTERNAL MEDICINE CLINIC | Facility: CLINIC | Age: 69
End: 2019-05-14

## 2019-05-14 VITALS
SYSTOLIC BLOOD PRESSURE: 117 MMHG | RESPIRATION RATE: 20 BRPM | BODY MASS INDEX: 29.98 KG/M2 | HEART RATE: 72 BPM | OXYGEN SATURATION: 98 % | HEIGHT: 67 IN | WEIGHT: 191 LBS | DIASTOLIC BLOOD PRESSURE: 72 MMHG | TEMPERATURE: 98 F

## 2019-05-14 DIAGNOSIS — J30.9 ALLERGIC RHINITIS, UNSPECIFIED SEASONALITY, UNSPECIFIED TRIGGER: ICD-10-CM

## 2019-05-14 DIAGNOSIS — J32.0 MAXILLARY SINUSITIS, UNSPECIFIED CHRONICITY: Primary | ICD-10-CM

## 2019-05-14 PROCEDURE — G0463 HOSPITAL OUTPT CLINIC VISIT: HCPCS | Performed by: INTERNAL MEDICINE

## 2019-05-14 PROCEDURE — 99214 OFFICE O/P EST MOD 30 MIN: CPT | Performed by: INTERNAL MEDICINE

## 2019-05-14 RX ORDER — ALBUTEROL SULFATE 90 UG/1
2 AEROSOL, METERED RESPIRATORY (INHALATION) EVERY 4 HOURS PRN
Qty: 1 INHALER | Refills: 6 | Status: SHIPPED | OUTPATIENT
Start: 2019-05-14 | End: 2020-08-26

## 2019-05-14 RX ORDER — CEFUROXIME AXETIL 500 MG/1
500 TABLET ORAL 2 TIMES DAILY
Qty: 14 TABLET | Refills: 0 | Status: SHIPPED | OUTPATIENT
Start: 2019-05-14 | End: 2019-08-06

## 2019-05-14 NOTE — PROGRESS NOTES
HPI:    Patient ID: Mili Erazo is a 76year old female.   Patient presents with:  Sinus Problem: Pt state that she has sinus issues that started 9-10 days ago with coughing with chest congestion  Patient in office today  Left  sinus pain   And cough  fo Mometasone Furo-Formoterol Fum (DULERA) 200-5 MCG/ACT Inhalation Aerosol INHALE 2 PUFFS BY MOUTH(INTO THE LUNGS) TWICE DAILY Disp: 13 g Rfl: 0   Albuterol Sulfate HFA (VENTOLIN HFA) 108 (90 Base) MCG/ACT Inhalation Aero Soln Inhale 2 puffs into the lungs are normal. Right eye exhibits no discharge. Left eye exhibits no discharge. Right conjunctiva is not injected. Left conjunctiva is not injected. No scleral icterus. Neck: Neck supple. No JVD present. No thyromegaly present.    Cardiovascular: Normal rate Sig: Take 1 tablet (500 mg total) by mouth 2 (two) times daily.    • Mometasone Furo-Formoterol Fum (DULERA) 200-5 MCG/ACT Inhalation Aerosol 13 g 0     Sig: INHALE 2 PUFFS BY MOUTH(INTO THE LUNGS) TWICE DAILY   • Albuterol Sulfate HFA (VENTOLIN HFA) 108 (9

## 2019-05-14 NOTE — TELEPHONE ENCOUNTER
Fax received at [facility]. Plan does not cover this medication. Please call plan at [phone] to initiate PA or call/fax pharmacy to change medication. Patient ID# [id number].       Current Outpatient Medications:        Mometasone Furo-Formoterol Fum (D

## 2019-05-14 NOTE — TELEPHONE ENCOUNTER
PA for [facility] 200-5 mcg/act completed with [employer] via CMM response time 24-72 hours KEY [id number].

## 2019-05-15 RX ORDER — BUDESONIDE AND FORMOTEROL FUMARATE DIHYDRATE 160; 4.5 UG/1; UG/1
2 AEROSOL RESPIRATORY (INHALATION) 2 TIMES DAILY
Qty: 1 INHALER | Refills: 0 | Status: SHIPPED | OUTPATIENT
Start: 2019-05-15 | End: 2020-01-24

## 2019-07-03 PROBLEM — H25.13 NUCLEAR SCLEROTIC CATARACT OF BOTH EYES: Status: ACTIVE | Noted: 2019-07-03

## 2019-07-03 PROBLEM — H43.811 POSTERIOR VITREOUS DETACHMENT OF RIGHT EYE: Status: ACTIVE | Noted: 2019-07-03

## 2019-07-03 PROBLEM — H43.391 VITREOUS SYNERESIS, RIGHT: Status: ACTIVE | Noted: 2019-07-03

## 2019-08-06 ENCOUNTER — OFFICE VISIT (OUTPATIENT)
Dept: INTERNAL MEDICINE CLINIC | Facility: CLINIC | Age: 69
End: 2019-08-06
Payer: MEDICARE

## 2019-08-06 VITALS
WEIGHT: 200.5 LBS | OXYGEN SATURATION: 97 % | DIASTOLIC BLOOD PRESSURE: 67 MMHG | HEIGHT: 67 IN | BODY MASS INDEX: 31.47 KG/M2 | HEART RATE: 77 BPM | RESPIRATION RATE: 16 BRPM | TEMPERATURE: 98 F | SYSTOLIC BLOOD PRESSURE: 111 MMHG

## 2019-08-06 DIAGNOSIS — J04.11 ACUTE TRACHEITIS WITH OBSTRUCTION: Primary | ICD-10-CM

## 2019-08-06 DIAGNOSIS — J32.0 CHRONIC MAXILLARY SINUSITIS: ICD-10-CM

## 2019-08-06 PROCEDURE — G0463 HOSPITAL OUTPT CLINIC VISIT: HCPCS | Performed by: INTERNAL MEDICINE

## 2019-08-06 PROCEDURE — 99214 OFFICE O/P EST MOD 30 MIN: CPT | Performed by: INTERNAL MEDICINE

## 2019-08-06 RX ORDER — BUDESONIDE AND FORMOTEROL FUMARATE DIHYDRATE 160; 4.5 UG/1; UG/1
2 AEROSOL RESPIRATORY (INHALATION) 2 TIMES DAILY
Qty: 1 INHALER | Refills: 3 | Status: SHIPPED | OUTPATIENT
Start: 2019-08-06 | End: 2019-08-13

## 2019-08-06 RX ORDER — CEFUROXIME AXETIL 500 MG/1
500 TABLET ORAL 2 TIMES DAILY
Qty: 14 TABLET | Refills: 0 | Status: SHIPPED | OUTPATIENT
Start: 2019-08-06 | End: 2019-11-15

## 2019-08-10 ENCOUNTER — NURSE TRIAGE (OUTPATIENT)
Dept: OTHER | Age: 69
End: 2019-08-10

## 2019-08-10 NOTE — TELEPHONE ENCOUNTER
Spoke to patient, his symptoms sound worse than she saw me, advised her to go to immediate care center, patient in agreement

## 2019-08-10 NOTE — TELEPHONE ENCOUNTER
Action Requested: Summary for Provider     []  Critical Lab, Recommendations Needed  [] Need Additional Advice  []   FYI    []   Need Orders  [] Need Medications Sent to Pharmacy  []  Other     SUMMARY: Please advise Dr. Alison Gonzales:   Patient declined to go to

## 2019-08-11 NOTE — PROGRESS NOTES
HPI:    Patient ID: Mili Erazo is a 76year old female.   Presents for evaluation of the cough    HPI  Patient reports that 4 days ago she started to experience deep hacking cough, associated with postnasal drainage and pressure over maxillary and front Disp:  Rfl:    Omega-3 Fatty Acids (OMEGA-3 FISH OIL) 1000 MG Oral Cap Take  by mouth. Disp:  Rfl:    Multiple Vitamin (MULTIVITAMINS) Oral Cap Take  by mouth. Disp:  Rfl:    folic acid (FOLVITE) 698 MCG Oral Tab Take  by mouth.  Disp:  Rfl:    Glucosamine- 162 inhalations every 12 hours, continue Flonase, report in few days if not improved  Chronic maxillary sinusitis treat patient with Ceftin    No orders of the defined types were placed in this encounter.       Meds This Visit:  Requested Prescriptions

## 2019-08-13 ENCOUNTER — OFFICE VISIT (OUTPATIENT)
Dept: INTERNAL MEDICINE CLINIC | Facility: CLINIC | Age: 69
End: 2019-08-13
Payer: MEDICARE

## 2019-08-13 ENCOUNTER — HOSPITAL ENCOUNTER (OUTPATIENT)
Dept: GENERAL RADIOLOGY | Age: 69
Discharge: HOME | End: 2019-08-13
Attending: INTERNAL MEDICINE | Admitting: INTERNAL MEDICINE
Payer: MEDICARE

## 2019-08-13 VITALS
HEART RATE: 79 BPM | SYSTOLIC BLOOD PRESSURE: 131 MMHG | BODY MASS INDEX: 31 KG/M2 | DIASTOLIC BLOOD PRESSURE: 67 MMHG | WEIGHT: 196 LBS | RESPIRATION RATE: 20 BRPM | OXYGEN SATURATION: 97 % | TEMPERATURE: 98 F

## 2019-08-13 DIAGNOSIS — R05.9 COUGH: Primary | ICD-10-CM

## 2019-08-13 DIAGNOSIS — D72.818 OTHER DECREASED WHITE BLOOD CELL (WBC) COUNT: ICD-10-CM

## 2019-08-13 DIAGNOSIS — R05.9 COUGH: ICD-10-CM

## 2019-08-13 DIAGNOSIS — J20.9 BRONCHOSPASM WITH BRONCHITIS, ACUTE: ICD-10-CM

## 2019-08-13 DIAGNOSIS — J01.80 OTHER ACUTE SINUSITIS, RECURRENCE NOT SPECIFIED: ICD-10-CM

## 2019-08-13 PROCEDURE — 99214 OFFICE O/P EST MOD 30 MIN: CPT | Performed by: INTERNAL MEDICINE

## 2019-08-13 PROCEDURE — 71046 X-RAY EXAM CHEST 2 VIEWS: CPT | Performed by: INTERNAL MEDICINE

## 2019-08-13 PROCEDURE — G0463 HOSPITAL OUTPT CLINIC VISIT: HCPCS | Performed by: INTERNAL MEDICINE

## 2019-08-13 RX ORDER — METRONIDAZOLE 10 MG/G
GEL TOPICAL
Qty: 30 G | Refills: 3 | Status: SHIPPED | OUTPATIENT
Start: 2019-08-13 | End: 2019-11-13

## 2019-08-13 RX ORDER — CLARITHROMYCIN 500 MG/1
500 TABLET, COATED ORAL 2 TIMES DAILY
Qty: 20 TABLET | Refills: 0 | Status: SHIPPED | OUTPATIENT
Start: 2019-08-13 | End: 2019-08-21

## 2019-08-13 RX ORDER — CEFUROXIME AXETIL 500 MG/1
TABLET ORAL
Qty: 14 TABLET | Refills: 0 | OUTPATIENT
Start: 2019-08-13

## 2019-08-13 RX ORDER — PREDNISONE 10 MG/1
TABLET ORAL
Qty: 24 TABLET | Refills: 0 | Status: SHIPPED | OUTPATIENT
Start: 2019-08-13 | End: 2019-08-21

## 2019-08-13 NOTE — TELEPHONE ENCOUNTER
Assessment/Plan:    Patient Instructions   AFib heart rate controlled on current medication anticoagulated with Coumadin for following with Cardiology    Hypertension stable on current medication    Seasonal affective disorder improving as whether slowly improves    One episode of blood in stool taking Coumadin and aspirin she stopped aspirin back in October    I recommend she resume if symptoms return would check stool for occult blood    Health maintenance up-to-date did recommend new shingles vaccine that she gets from the pharmacy    Osteopenia continue calcium vitamin D weight-bearing exercise recheck 2 years    Subclinical hypothyroidism no indication for treatment continue to monitor    Hyperlipidemia LDL at goal on statin continue same    Headache takes Fioricet only as needed    Borderline B12 deficiency give B12 today recommend 1000 mcg daily under the tongue    Follow back 6 months sooner if needed         Diagnoses and all orders for this visit:    Atrial fibrillation, unspecified type (HCC)    Benign hypertension    Mitral valve disorder    Essential tremor    Inflammatory neuropathy (HCC)    Osteoporosis, unspecified osteoporosis type, unspecified pathological fracture presence    Chronic anticoagulation         Subjective:      Patient ID: Jatin Cornell is a 70 y o  female    Active no formal exercise  Was doing physical therapy for her neck it felt well while she was doing the therapy then over the last several months symptoms are starting to come back of achiness and strain  No home blood pressures but tolerating medicine  Back in October she had noticed what she thought was blood in her stool so she stopped her aspirin she has not restarted it  Periodic headaches takes Fioricet  Gets depressed only during the winter months  Feeling tired  Tolerating cholesterol medicine  Following with Cardiology for her Coumadin        Current Outpatient Medications:     amLODIPine (NORVASC) 5 mg tablet, Take 1 RN pls call pt and verify the need of rx refill, pls triage or offer ov if needed, thanks. Review pended refill request as it does not fall under a protocol.   Requested Prescriptions     Pending Prescriptions Disp Refills   • CEFUROXIME AXETIL 500 tablet (5 mg total) by mouth every morning, Disp: 90 tablet, Rfl: 3    aspirin 81 mg chewable tablet, Chew daily, Disp: , Rfl:     butalbital-acetaminophen-caffeine (FIORICET,ESGIC) -40 mg per tablet, TAKE 1 TABLET BY MOUTH EVERY 4-6HRS AS NEEDED FOR HEADACHE, Disp: 30 tablet, Rfl: 3    Calcium Carbonate (CALTRATE 600) 1500 (600 Ca) MG TABS, Take by mouth, Disp: , Rfl:     clobetasol (OLUX) 0 05 % topical foam, Apply topically 2 (two) times a day, Disp: 100 g, Rfl: 2    Flaxseed, Linseed, (FLAXSEED OIL) 1200 MG CAPS, Take by mouth, Disp: , Rfl:     gabapentin (NEURONTIN) 100 mg capsule, TAKE 1 CAPSULE (100 MG TOTAL) BY MOUTH DAILY, Disp: 90 capsule, Rfl: 0    gabapentin (NEURONTIN) 300 mg capsule, TAKE 1 CAPSULE BY MOUTH EVERY DAY, Disp: 90 capsule, Rfl: 0    glycopyrrolate (ROBINUL) 2 MG tablet, Take by mouth, Disp: , Rfl:     LORazepam (ATIVAN) 1 mg tablet, Take 1 po bid prn, Disp: 30 tablet, Rfl: 2    losartan (COZAAR) 100 MG tablet, Take 1 tablet (100 mg total) by mouth daily, Disp: 90 tablet, Rfl: 3    propranolol (INDERAL) 20 mg tablet, TAKE 2 TABLETS IN AM AND 1 TABLET IN EVENING, Disp: 270 tablet, Rfl: 1    simvastatin (ZOCOR) 10 mg tablet, TAKE 1 TABLET EVERY DAY, Disp: 90 tablet, Rfl: 3    warfarin (COUMADIN) 1 mg tablet, Take 1 daily as directed - BRAND NECESSARY!!!!!!!, Disp: 90 tablet, Rfl: 3    warfarin (COUMADIN) 2 mg tablet, Take 1 tablet daily as directed   Dispense BRAND ONLY, Disp: 90 tablet, Rfl: 3    warfarin (COUMADIN) 3 mg tablet, Take one tab daily as directed by doctor- BRAND NECESSARY, Disp: 90 tablet, Rfl: 3    Current Facility-Administered Medications:     cyanocobalamin injection 1,000 mcg, 1,000 mcg, Intramuscular, Q30 Days, HANNY Carrillo, 1,000 mcg at 03/07/19 1035    Recent Results (from the past 1008 hour(s))   Protime-INR    Collection Time: 02/19/19  9:43 AM   Result Value Ref Range    Protime 26 4 (H) 11 8 - 14 2 seconds    INR 2 42 (H) 0 86 - 1 17   CBC and differential    Collection Time: 02/27/19  7:48 AM   Result Value Ref Range    WBC 4 89 4 31 - 10 16 Thousand/uL    RBC 3 97 3 81 - 5 12 Million/uL    Hemoglobin 12 5 11 5 - 15 4 g/dL    Hematocrit 39 3 34 8 - 46 1 %    MCV 99 (H) 82 - 98 fL    MCH 31 5 26 8 - 34 3 pg    MCHC 31 8 31 4 - 37 4 g/dL    RDW 12 6 11 6 - 15 1 %    MPV 9 6 8 9 - 12 7 fL    Platelets 366 846 - 915 Thousands/uL    nRBC 0 /100 WBCs    Neutrophils Relative 54 43 - 75 %    Immat GRANS % 0 0 - 2 %    Lymphocytes Relative 29 14 - 44 %    Monocytes Relative 11 4 - 12 %    Eosinophils Relative 5 0 - 6 %    Basophils Relative 1 0 - 1 %    Neutrophils Absolute 2 65 1 85 - 7 62 Thousands/µL    Immature Grans Absolute 0 01 0 00 - 0 20 Thousand/uL    Lymphocytes Absolute 1 40 0 60 - 4 47 Thousands/µL    Monocytes Absolute 0 54 0 17 - 1 22 Thousand/µL    Eosinophils Absolute 0 24 0 00 - 0 61 Thousand/µL    Basophils Absolute 0 05 0 00 - 0 10 Thousands/µL   Comprehensive metabolic panel    Collection Time: 02/27/19  7:48 AM   Result Value Ref Range    Sodium 139 136 - 145 mmol/L    Potassium 4 3 3 5 - 5 3 mmol/L    Chloride 107 100 - 108 mmol/L    CO2 29 21 - 32 mmol/L    ANION GAP 3 (L) 4 - 13 mmol/L    BUN 19 5 - 25 mg/dL    Creatinine 0 83 0 60 - 1 30 mg/dL    Glucose, Fasting 94 65 - 99 mg/dL    Calcium 8 5 8 3 - 10 1 mg/dL    AST 22 5 - 45 U/L    ALT 21 12 - 78 U/L    Alkaline Phosphatase 68 46 - 116 U/L    Total Protein 7 5 6 4 - 8 2 g/dL    Albumin 3 6 3 5 - 5 0 g/dL    Total Bilirubin 0 48 0 20 - 1 00 mg/dL    eGFR 71 ml/min/1 73sq m   Lipid panel    Collection Time: 02/27/19  7:48 AM   Result Value Ref Range    Cholesterol 162 50 - 200 mg/dL    Triglycerides 89 <=150 mg/dL    HDL, Direct 66 (H) 40 - 60 mg/dL    LDL Calculated 78 0 - 100 mg/dL    Non-HDL-Chol (CHOL-HDL) 96 mg/dl   TSH, 3rd generation    Collection Time: 02/27/19  7:48 AM   Result Value Ref Range    TSH 3RD GENERATON 4 330 (H) 0 358 - 3 740 uIU/mL   Protime-INR    Collection Time: 03/20/19 10:12 AM   Result Value Ref Range    Protime 31 3 (H) 11 8 - 14 2 seconds    INR 3 02 (H) 0 86 - 1 17       The following portions of the patient's history were reviewed and updated as appropriate: allergies, current medications, past family history, past medical history, past social history, past surgical history and problem list      Review of Systems   Constitutional: Negative for appetite change, chills, diaphoresis, fatigue, fever and unexpected weight change  HENT: Negative for postnasal drip and sneezing  Eyes: Negative for visual disturbance  Respiratory: Negative for chest tightness and shortness of breath  Cardiovascular: Negative for chest pain, palpitations and leg swelling  Gastrointestinal: Negative for abdominal pain and blood in stool  Endocrine: Negative for cold intolerance, heat intolerance, polydipsia, polyphagia and polyuria  Genitourinary: Negative for difficulty urinating, dysuria, frequency and urgency  Musculoskeletal: Negative for arthralgias and myalgias  Skin: Negative for rash and wound  Neurological: Negative for dizziness, weakness, light-headedness and headaches  Hematological: Negative for adenopathy  Psychiatric/Behavioral: Negative for confusion, dysphoric mood and sleep disturbance  The patient is not nervous/anxious  Objective:      LMP  (LMP Unknown)        Physical Exam   Constitutional: She is oriented to person, place, and time  She appears well-developed and well-nourished  HENT:   Head: Normocephalic and atraumatic  Nose: Nose normal    Mouth/Throat: Oropharynx is clear and moist    Eyes: Pupils are equal, round, and reactive to light  Conjunctivae and EOM are normal  No scleral icterus  Neck: Normal range of motion  Neck supple  No JVD present  No tracheal deviation present  No thyromegaly present  Cardiovascular: Normal rate and intact distal pulses  Exam reveals no gallop and no friction rub     Murmur heard   Irregularly irregular w/  prosthetic mitral valve   Pulmonary/Chest: Effort normal and breath sounds normal  No respiratory distress  She has no wheezes  She has no rales  Abdominal: Soft  Bowel sounds are normal    Musculoskeletal: She exhibits no edema or tenderness  Lymphadenopathy:     She has no cervical adenopathy  Neurological: She is alert and oriented to person, place, and time  No cranial nerve deficit  Skin: Skin is warm and dry  No rash noted  No erythema  Psychiatric: She has a normal mood and affect  Her behavior is normal  Judgment and thought content normal    Nursing note reviewed

## 2019-08-13 NOTE — PROGRESS NOTES
HPI:    Patient ID: Viky Nogueira is a 76year old female.   Presents for follow-up on cough    HPI  Patient was seen in office 1 week ago with beginning of the cold symptoms, she has been having a lot of nasal congestion finally mucus is draining, also sh 0   Ketotifen Fumarate 0.025 % Ophthalmic Solution Place 1 drop into both eyes 2 (two) times daily. Disp:  Rfl:    Budesonide-Formoterol Fumarate (SYMBICORT) 160-4.5 MCG/ACT Inhalation Aerosol Inhale 2 puffs into the lungs 2 (two) times daily.  Disp: 1 Inha present. Pulmonary/Chest: Effort normal. No respiratory distress. She has no wheezes. Prolonged expiratory phase, deep cough while in office   Lymphadenopathy:     She has no cervical adenopathy.    Neurological: She is alert and oriented to person, plac

## 2019-08-17 ENCOUNTER — TELEPHONE (OUTPATIENT)
Dept: OTHER | Age: 69
End: 2019-08-17

## 2019-08-17 NOTE — TELEPHONE ENCOUNTER
Spoke to patient, she will continue to taper off steroids, does feel better, able to sleep better, will report next week if not improved

## 2019-08-17 NOTE — TELEPHONE ENCOUNTER
Pt states she was advised to call Dr. Angelita Lau with condition update, pt was seen in office twice for bronchitis, sinus infection and asthma problems, LOV 8/13/19.  Pt state she is feeling better but still has a \"sticky\" feeling in her chest. Cough is produ

## 2019-08-21 RX ORDER — CLARITHROMYCIN 500 MG/1
500 TABLET, COATED ORAL 2 TIMES DAILY
Qty: 8 TABLET | Refills: 0 | Status: SHIPPED | OUTPATIENT
Start: 2019-08-21 | End: 2019-11-15

## 2019-08-21 RX ORDER — FLUTICASONE PROPIONATE 50 MCG
2 SPRAY, SUSPENSION (ML) NASAL DAILY
Qty: 1 BOTTLE | Refills: 3 | Status: SHIPPED | OUTPATIENT
Start: 2019-08-21

## 2019-08-21 NOTE — TELEPHONE ENCOUNTER
Pt states still having post nasal drip , sinus nasal congestion,  coughing up phlegm with whitish drainage. Denies  SOB, chest congestion, fever; Pt is Prednisone, last dose tomorrow. Symbicort , HFA inhaler . Staets she is not wheezing anymore.   She is ta

## 2019-09-07 ENCOUNTER — HOSPITAL ENCOUNTER (OUTPATIENT)
Age: 69
Discharge: HOME | End: 2019-09-07
Attending: EMERGENCY MEDICINE
Payer: MEDICARE

## 2019-09-07 ENCOUNTER — APPOINTMENT (OUTPATIENT)
Dept: GENERAL RADIOLOGY | Age: 69
End: 2019-09-07
Attending: EMERGENCY MEDICINE
Payer: MEDICARE

## 2019-09-07 VITALS
DIASTOLIC BLOOD PRESSURE: 41 MMHG | HEART RATE: 84 BPM | SYSTOLIC BLOOD PRESSURE: 115 MMHG | BODY MASS INDEX: 29.82 KG/M2 | RESPIRATION RATE: 20 BRPM | HEIGHT: 67 IN | TEMPERATURE: 98 F | OXYGEN SATURATION: 99 % | WEIGHT: 190 LBS

## 2019-09-07 DIAGNOSIS — M19.90 ARTHRITIS: ICD-10-CM

## 2019-09-07 DIAGNOSIS — M25.561 ACUTE PAIN OF RIGHT KNEE: Primary | ICD-10-CM

## 2019-09-07 PROCEDURE — 73560 X-RAY EXAM OF KNEE 1 OR 2: CPT | Performed by: EMERGENCY MEDICINE

## 2019-09-07 PROCEDURE — 99213 OFFICE O/P EST LOW 20 MIN: CPT

## 2019-09-07 PROCEDURE — 99214 OFFICE O/P EST MOD 30 MIN: CPT

## 2019-09-07 RX ORDER — DICLOFENAC SODIUM 75 MG/1
75 TABLET, DELAYED RELEASE ORAL 2 TIMES DAILY
Qty: 30 TABLET | Refills: 0 | Status: SHIPPED | OUTPATIENT
Start: 2019-09-07 | End: 2019-11-15

## 2019-09-07 NOTE — ED NOTES
No leg swelling noted, skin warm and dry, no redness, states has had cough and was on 2 antibiotics recently

## 2019-09-07 NOTE — ED PROVIDER NOTES
Patient Seen in: [address] [facility]    History   Patient presents with:  Musculoskeletal Problem    Stated Complaint: R-KNEE PAIN    HPI    Patient presents with knee pain that she has been dealing with recently.   Its been impair into the lungs 2 (two) times daily. Albuterol Sulfate HFA (VENTOLIN HFA) 108 (90 Base) MCG/ACT Inhalation Aero Soln,  Inhale 2 puffs into the lungs every 4 (four) hours as needed for Wheezing.    metRONIDAZOLE 0.75 % Vaginal Gel,  1 applicator full insert scleral icterus. Eyelids appear normal, no lesions. Cardiovascular:  Normal S1 and S2, no murmur, regular, with good peripheral perfusion. Respiratory:  Lungs clear to auscultation bilaterally with good effort. No wheezes, ronchi, or rales.   Abdomen: reassessment of your blood pressure.       Clinical Impression:  Acute pain of right knee  (primary encounter diagnosis)  Arthritis    Disposition:  Discharge    Follow-up:  Rey Grey MD  [address] [place] [address] [phone]    In 2 days

## 2019-09-07 NOTE — ED INITIAL ASSESSMENT (HPI)
r knee /r calf pain for 2 weeks, states was on her knee 2 weeks ago cleaning her floor, denies direct trauma or injury, cms intact

## 2019-10-17 PROBLEM — H01.00A BLEPHARITIS OF UPPER AND LOWER EYELIDS OF BOTH EYES, UNSPECIFIED TYPE: Status: ACTIVE | Noted: 2019-10-17

## 2019-10-17 PROBLEM — H01.00B BLEPHARITIS OF UPPER AND LOWER EYELIDS OF BOTH EYES, UNSPECIFIED TYPE: Status: ACTIVE | Noted: 2019-10-17

## 2019-10-30 ENCOUNTER — TELEPHONE (OUTPATIENT)
Dept: INTERNAL MEDICINE CLINIC | Facility: CLINIC | Age: 69
End: 2019-10-30

## 2019-10-30 NOTE — TELEPHONE ENCOUNTER
Patient scheduled a Pre Op appointment 11/15 with Dr. Ainceto Roy. Patient states she will be having Cataract Surgery with Dr. Kailash Smith (Ophthalmology) 12/11 ph# [phone].

## 2019-11-14 RX ORDER — METRONIDAZOLE 10 MG/G
GEL TOPICAL
Qty: 30 G | Refills: 3 | Status: SHIPPED | OUTPATIENT
Start: 2019-11-14

## 2019-11-15 ENCOUNTER — OFFICE VISIT (OUTPATIENT)
Dept: INTERNAL MEDICINE CLINIC | Facility: CLINIC | Age: 69
End: 2019-11-15
Payer: MEDICARE

## 2019-11-15 ENCOUNTER — APPOINTMENT (OUTPATIENT)
Dept: LAB | Age: 69
End: 2019-11-15
Attending: INTERNAL MEDICINE
Payer: MEDICARE

## 2019-11-15 ENCOUNTER — LAB ENCOUNTER (OUTPATIENT)
Dept: LAB | Age: 69
End: 2019-11-15
Attending: INTERNAL MEDICINE
Payer: MEDICARE

## 2019-11-15 VITALS
RESPIRATION RATE: 18 BRPM | SYSTOLIC BLOOD PRESSURE: 117 MMHG | DIASTOLIC BLOOD PRESSURE: 61 MMHG | BODY MASS INDEX: 32 KG/M2 | WEIGHT: 204 LBS | HEART RATE: 76 BPM

## 2019-11-15 DIAGNOSIS — Z01.818 PREOP EXAMINATION: Primary | ICD-10-CM

## 2019-11-15 DIAGNOSIS — R89.9 ABNORMAL LABORATORY TEST RESULT: ICD-10-CM

## 2019-11-15 DIAGNOSIS — D72.818 OTHER DECREASED WHITE BLOOD CELL (WBC) COUNT: ICD-10-CM

## 2019-11-15 DIAGNOSIS — Z01.818 PREOP EXAMINATION: ICD-10-CM

## 2019-11-15 DIAGNOSIS — H25.89 OTHER AGE-RELATED CATARACT OF LEFT EYE: ICD-10-CM

## 2019-11-15 DIAGNOSIS — R05.9 COUGH: ICD-10-CM

## 2019-11-15 DIAGNOSIS — D75.89 MACROCYTOSIS: ICD-10-CM

## 2019-11-15 PROCEDURE — 99213 OFFICE O/P EST LOW 20 MIN: CPT | Performed by: INTERNAL MEDICINE

## 2019-11-15 PROCEDURE — 82746 ASSAY OF FOLIC ACID SERUM: CPT

## 2019-11-15 PROCEDURE — 85025 COMPLETE CBC W/AUTO DIFF WBC: CPT

## 2019-11-15 PROCEDURE — 83615 LACTATE (LD) (LDH) ENZYME: CPT

## 2019-11-15 PROCEDURE — 93010 ELECTROCARDIOGRAM REPORT: CPT | Performed by: INTERNAL MEDICINE

## 2019-11-15 PROCEDURE — 36415 COLL VENOUS BLD VENIPUNCTURE: CPT

## 2019-11-15 PROCEDURE — 82607 VITAMIN B-12: CPT

## 2019-11-15 PROCEDURE — 80048 BASIC METABOLIC PNL TOTAL CA: CPT

## 2019-11-15 PROCEDURE — G0463 HOSPITAL OUTPT CLINIC VISIT: HCPCS | Performed by: INTERNAL MEDICINE

## 2019-11-15 PROCEDURE — 93005 ELECTROCARDIOGRAM TRACING: CPT

## 2019-11-15 RX ORDER — MOMETASONE FUROATE 1 MG/G
CREAM TOPICAL
Qty: 15 G | Refills: 0 | Status: SHIPPED | OUTPATIENT
Start: 2019-11-15 | End: 2020-08-20

## 2019-11-15 NOTE — TELEPHONE ENCOUNTER
Review pended refill request as it does not fall under a protocol.   Requested Prescriptions     Pending Prescriptions Disp Refills   • metroNIDAZOLE 1 % External Gel 30 g 3     Sig: Apply  Twice a day         Recent Visits  Date Type Provider Dept   08/13/

## 2019-11-16 NOTE — PROGRESS NOTES
HPI:    Patient ID: Maryam Tapia is a 76year old female. Presents for preop evaluation for cataract surgery    HPI  Patient is scheduled for cataract removal in early December will be done by Dr. Devante Powers. Patient reports decreased vision in the left eye. Oral Tab Take  by mouth. • Glucosamine-Chondroitin (OPTIFLEX COMPLETE) 750 & 400 MG Oral Misc Take  by mouth. • Vitamin B-12 (VITAMIN B12) 100 MCG Oral Tab Take  by mouth.        Allergies:  Seasonal                ITCHING   /61 (BP Location:

## 2019-11-18 ENCOUNTER — PATIENT MESSAGE (OUTPATIENT)
Dept: INTERNAL MEDICINE CLINIC | Facility: CLINIC | Age: 69
End: 2019-11-18

## 2019-11-19 NOTE — TELEPHONE ENCOUNTER
steve Gan advise.  Patient referring to result below:    Viewed by Maurice Meza on 11/18/2019  3:53 PM   Written by Kathy Marinelli MD on 11/16/2019  7:23 AM   Abnormal EKG but no changes reported compared to 2010.  Swati call my office if y

## 2019-11-19 NOTE — TELEPHONE ENCOUNTER
From: Veronia Gowers  To: Geraldina Skiff, MD  Sent: 11/18/2019 3:57 PM CST  Subject: Test Results Question    Noticed recent EKG is 'abnormal'. What would cause this? And should I be concerned? Thank you.

## 2019-11-19 NOTE — TELEPHONE ENCOUNTER
Spoke to patient, reviewed her concerns, she states that she has no cardiac symptoms and does not want to proceed with any evaluation at this point, she will let me know if develops shortness of breath, lightheadedness chest pain etc.

## 2019-12-12 PROBLEM — Z98.890 POSTSURGICAL STATE, EYE: Status: ACTIVE | Noted: 2019-12-12

## 2019-12-12 PROBLEM — Z96.1 PSEUDOPHAKIA OF RIGHT EYE: Status: ACTIVE | Noted: 2019-12-12

## 2020-01-17 ENCOUNTER — OFFICE VISIT (OUTPATIENT)
Dept: INTERNAL MEDICINE CLINIC | Facility: CLINIC | Age: 70
End: 2020-01-17
Payer: MEDICARE

## 2020-01-17 VITALS
WEIGHT: 210 LBS | DIASTOLIC BLOOD PRESSURE: 63 MMHG | OXYGEN SATURATION: 97 % | TEMPERATURE: 98 F | SYSTOLIC BLOOD PRESSURE: 120 MMHG | RESPIRATION RATE: 18 BRPM | HEART RATE: 79 BPM | BODY MASS INDEX: 33 KG/M2

## 2020-01-17 DIAGNOSIS — H66.92 OTITIS OF LEFT EAR: ICD-10-CM

## 2020-01-17 DIAGNOSIS — J32.9 OTHER SINUSITIS, UNSPECIFIED CHRONICITY: Primary | ICD-10-CM

## 2020-01-17 PROCEDURE — 99213 OFFICE O/P EST LOW 20 MIN: CPT | Performed by: INTERNAL MEDICINE

## 2020-01-17 PROCEDURE — G0463 HOSPITAL OUTPT CLINIC VISIT: HCPCS | Performed by: INTERNAL MEDICINE

## 2020-01-17 RX ORDER — CLARITHROMYCIN 500 MG/1
500 TABLET, COATED ORAL 2 TIMES DAILY
Qty: 14 TABLET | Refills: 0 | Status: SHIPPED | OUTPATIENT
Start: 2020-01-17 | End: 2020-01-24

## 2020-01-17 NOTE — PROGRESS NOTES
HPI:    Patient ID: Harinder Husain is a 71year old female.   Presents for evaluation of cold symptoms    HPI  About 10 days ago developed nasal congestion, fatigue, started using Flonase few days ago feels left ear ache and postnasal drainage, felt somewha weeks 15 g 0   • metroNIDAZOLE 1 % External Gel Apply  Twice a day 30 g 3   • Fluticasone Propionate 50 MCG/ACT Nasal Suspension 2 sprays by Each Nare route daily.  1 Bottle 3   • Budesonide-Formoterol Fumarate (SYMBICORT) 160-4.5 MCG/ACT Inhalation Aerosol has no wheezes. She has no rales. Lymphadenopathy:     She has no cervical adenopathy. Neurological: She is alert and oriented to person, place, and time. No cranial nerve deficit or motor deficit.               ASSESSMENT/PLAN:   Other sinusitis, unspe

## 2020-01-24 ENCOUNTER — TELEPHONE (OUTPATIENT)
Dept: OTHER | Age: 70
End: 2020-01-24

## 2020-01-24 ENCOUNTER — TELEPHONE (OUTPATIENT)
Dept: INTERNAL MEDICINE CLINIC | Facility: CLINIC | Age: 70
End: 2020-01-24

## 2020-01-24 RX ORDER — CLARITHROMYCIN 500 MG/1
TABLET, COATED ORAL
Qty: 14 TABLET | Refills: 0 | OUTPATIENT
Start: 2020-01-24

## 2020-01-24 RX ORDER — BUDESONIDE AND FORMOTEROL FUMARATE DIHYDRATE 160; 4.5 UG/1; UG/1
2 AEROSOL RESPIRATORY (INHALATION) 2 TIMES DAILY
Qty: 1 INHALER | Refills: 1 | Status: SHIPPED | OUTPATIENT
Start: 2020-01-24

## 2020-01-24 RX ORDER — CLARITHROMYCIN 500 MG/1
500 TABLET, COATED ORAL 2 TIMES DAILY
Qty: 14 TABLET | Refills: 0 | Status: CANCELLED | OUTPATIENT
Start: 2020-01-24

## 2020-01-24 NOTE — TELEPHONE ENCOUNTER
Verified pt name and . Pt states she has completed course of Clarithromycin prescribed 20. Pt states she is still having symptoms, not feeling much better than when seen if office. Continues to have post-nasal drip, feels tired.  Pt has not checked

## 2020-01-24 NOTE — TELEPHONE ENCOUNTER
RN pls call [facility] and verify if they rec'd rx that was sent on 1-17-19. Also call pt with an condition update, also COPsynchart sent to pt. thanks.      Requested Prescriptions     Pending Prescriptions Disp Refills   • clarithromycin 500 MG Oral Tab

## 2020-01-24 NOTE — TELEPHONE ENCOUNTER
Spoke to pt advised to finish antibiotics, stay on the current regimen of medication, and report in 7 to 10 days again if symptoms not improving

## 2020-06-25 ENCOUNTER — OFFICE VISIT (OUTPATIENT)
Dept: DERMATOLOGY CLINIC | Facility: CLINIC | Age: 70
End: 2020-06-25
Payer: MEDICARE

## 2020-06-25 DIAGNOSIS — D23.9 BENIGN NEOPLASM OF SKIN, UNSPECIFIED LOCATION: ICD-10-CM

## 2020-06-25 DIAGNOSIS — L82.1 SEBORRHEIC KERATOSES: ICD-10-CM

## 2020-06-25 DIAGNOSIS — I78.1 TELANGIECTASIA: ICD-10-CM

## 2020-06-25 DIAGNOSIS — L71.9 ROSACEA: ICD-10-CM

## 2020-06-25 DIAGNOSIS — L57.0 AK (ACTINIC KERATOSIS): Primary | ICD-10-CM

## 2020-06-25 PROCEDURE — 17000 DESTRUCT PREMALG LESION: CPT | Performed by: DERMATOLOGY

## 2020-06-25 PROCEDURE — 99202 OFFICE O/P NEW SF 15 MIN: CPT | Performed by: DERMATOLOGY

## 2020-06-25 PROCEDURE — 17003 DESTRUCT PREMALG LES 2-14: CPT | Performed by: DERMATOLOGY

## 2020-06-25 PROCEDURE — G0463 HOSPITAL OUTPT CLINIC VISIT: HCPCS | Performed by: DERMATOLOGY

## 2020-06-29 NOTE — PROGRESS NOTES
Kameron Shah is a 71year old female. HPI:     CC:  Patient presents with:  Lesion: LOV 6/614. pt presenting today with lesion to R cheek for a year. Denies pain or itching. Lesion has changed in size.  pt has HX of BCC        Allergies:  Seasonal    HIS Place 1 drop into the right eye every 2 (two) hours while awake. Use after surgery as directed (Patient taking differently: Place 1 drop into the right eye 4 (four) times daily.  Use after surgery as directed ) 1 Bottle 0   • Mometasone Furoate 0.1 % Extern 12/11/2019    LENSX/ORA TORIC MAC  [id number]  +14.5 D   DR. BORDEN   • CHOLECYSTECTOMY  2009   • D & C     • OTHER SURGICAL HISTORY  1975    wedge cut     Social History    Socioeconomic History      Marital status:       Spouse name: Not on file      Nu Asked        Grew up on a farm: Not Asked        History of tanning: Not Asked        Outdoor occupation: Not Asked        Breast feeding: Not Asked        Reaction to local anesthetic: No    Social History Narrative      Not on file    Family History   Pr external ears , arms, digits,palms. Multiple light to medium brown, well marginated, uniformly pigmented, macules and papules 6 mm and less scattered on exam. pigmented lesions examined with dermoscopy benign-appearing patterns.      Waxy tannish kerato agrees to the plan. The patient is asked to return as noted in follow-up/ above. This note was generated using Dragon voice recognition software. Please contact me regarding any confusion resulting from errors in recognition. Brielle Calhoun

## 2020-07-31 PROBLEM — H04.123 DRY EYE SYNDROME OF BILATERAL LACRIMAL GLANDS: Status: ACTIVE | Noted: 2020-07-31

## 2020-08-20 RX ORDER — MOMETASONE FUROATE 1 MG/G
CREAM TOPICAL
Qty: 15 G | Refills: 0 | Status: SHIPPED | OUTPATIENT
Start: 2020-08-20 | End: 2021-07-21

## 2020-08-26 RX ORDER — ALBUTEROL SULFATE 90 UG/1
2 AEROSOL, METERED RESPIRATORY (INHALATION) EVERY 4 HOURS PRN
Qty: 1 INHALER | Refills: 6 | Status: SHIPPED | OUTPATIENT
Start: 2020-08-26

## 2021-03-15 DIAGNOSIS — Z23 NEED FOR VACCINATION: ICD-10-CM

## 2021-07-21 RX ORDER — MOMETASONE FUROATE 1 MG/G
CREAM TOPICAL
Qty: 15 G | Refills: 0 | Status: SHIPPED | OUTPATIENT
Start: 2021-07-21
